# Patient Record
Sex: FEMALE | NOT HISPANIC OR LATINO | URBAN - METROPOLITAN AREA
[De-identification: names, ages, dates, MRNs, and addresses within clinical notes are randomized per-mention and may not be internally consistent; named-entity substitution may affect disease eponyms.]

---

## 2024-09-12 ENCOUNTER — LAB OUTSIDE AN ENCOUNTER (OUTPATIENT)
Dept: URBAN - METROPOLITAN AREA CLINIC 54 | Facility: CLINIC | Age: 59
End: 2024-09-12

## 2024-09-12 ENCOUNTER — OFFICE VISIT (OUTPATIENT)
Dept: URBAN - METROPOLITAN AREA CLINIC 54 | Facility: CLINIC | Age: 59
End: 2024-09-12
Payer: COMMERCIAL

## 2024-09-12 ENCOUNTER — DASHBOARD ENCOUNTERS (OUTPATIENT)
Age: 59
End: 2024-09-12

## 2024-09-12 VITALS
HEIGHT: 65 IN | WEIGHT: 161.1 LBS | HEART RATE: 94 BPM | SYSTOLIC BLOOD PRESSURE: 117 MMHG | DIASTOLIC BLOOD PRESSURE: 76 MMHG | TEMPERATURE: 97.3 F | BODY MASS INDEX: 26.84 KG/M2

## 2024-09-12 DIAGNOSIS — R10.12 LUQ PAIN: ICD-10-CM

## 2024-09-12 DIAGNOSIS — R68.81 EARLY SATIETY: ICD-10-CM

## 2024-09-12 DIAGNOSIS — R19.5 LOOSE STOOLS: ICD-10-CM

## 2024-09-12 DIAGNOSIS — Z12.11 COLON CANCER SCREENING: ICD-10-CM

## 2024-09-12 DIAGNOSIS — R14.0 ABDOMINAL BLOATING: ICD-10-CM

## 2024-09-12 PROCEDURE — 99214 OFFICE O/P EST MOD 30 MIN: CPT

## 2024-09-12 RX ORDER — TELMISARTAN 40 MG/1
1 TABLET TABLET ORAL ONCE A DAY
Status: ACTIVE | COMMUNITY

## 2024-09-12 RX ORDER — PANTOPRAZOLE SODIUM 40 MG/1
1 TABLET TABLET, DELAYED RELEASE ORAL ONCE A DAY
Status: ACTIVE | COMMUNITY

## 2024-09-12 RX ORDER — FLUVOXAMINE MALEATE 100 MG/1
1 TABLET AT BEDTIME TABLET, FILM COATED ORAL ONCE A DAY
Status: ACTIVE | COMMUNITY

## 2024-09-12 RX ORDER — HYDROCHLOROTHIAZIDE 12.5 MG/1
1 CAPSULE IN THE MORNING CAPSULE, GELATIN COATED ORAL ONCE A DAY
Status: ACTIVE | COMMUNITY

## 2024-09-12 NOTE — HPI-TODAY'S VISIT:
9/12/24: Patient is a 58 yo female with a PMH of HTN, fatty liver, GERD, and hx of CCY who was referred by COLLEEN Camacho for colon cancer screening. A copy of this document will be sent to the provider. Patient complains of postprandial LUQ pain and and bloating for the last few months, worsening over time. Reports early satiety, starting to get food avoidant. On pantoprazole 40 for years for heartburn/reflux which is controlled. Lots of bloating/distension. Leans towards diarrhea with 2-4 BMs/day which has worsened over time. Abd pain unrelated to defecation. No hematochezia, melena, or unintentional weight loss. Last cscope was normal 10 years ago. Last EGD was prior to that. No cardiopulmonary disease.

## 2024-09-12 NOTE — PHYSICAL EXAM GASTROINTESTINAL
Abdomen , mild left sided and epigastric TTP, soft, nondistended, no rebound or guarding, no masses palpable

## 2024-11-18 ENCOUNTER — OFFICE VISIT (OUTPATIENT)
Dept: URBAN - METROPOLITAN AREA SURGERY CENTER 14 | Facility: SURGERY CENTER | Age: 59
End: 2024-11-18
Payer: COMMERCIAL

## 2024-11-18 DIAGNOSIS — K31.7 GASTRIC POLYP: ICD-10-CM

## 2024-11-18 DIAGNOSIS — K22.89 DILATATION OF ESOPHAGUS: ICD-10-CM

## 2024-11-18 DIAGNOSIS — R19.7 ACUTE DIARRHEA: ICD-10-CM

## 2024-11-18 DIAGNOSIS — D12.4 ADENOMA OF DESCENDING COLON: ICD-10-CM

## 2024-11-18 DIAGNOSIS — R10.13 ABDOMINAL DISCOMFORT, EPIGASTRIC: ICD-10-CM

## 2024-11-18 DIAGNOSIS — K63.89 BACTERIAL OVERGROWTH SYNDROME: ICD-10-CM

## 2024-11-18 DIAGNOSIS — K31.7 BENIGN GASTRIC POLYP: ICD-10-CM

## 2024-11-18 DIAGNOSIS — K29.60 ADENOPAPILLOMATOSIS GASTRICA: ICD-10-CM

## 2024-11-18 DIAGNOSIS — K29.70 GASTRITIS: ICD-10-CM

## 2024-11-18 DIAGNOSIS — D12.4 BENIGN NEOPLASM OF DESCENDING COLON: ICD-10-CM

## 2024-11-18 PROCEDURE — 45380 COLONOSCOPY AND BIOPSY: CPT | Performed by: INTERNAL MEDICINE

## 2024-11-18 PROCEDURE — 43239 EGD BIOPSY SINGLE/MULTIPLE: CPT | Performed by: INTERNAL MEDICINE

## 2024-11-18 PROCEDURE — 45385 COLONOSCOPY W/LESION REMOVAL: CPT | Performed by: INTERNAL MEDICINE

## 2024-11-18 PROCEDURE — 00813 ANES UPR LWR GI NDSC PX: CPT | Performed by: NURSE ANESTHETIST, CERTIFIED REGISTERED

## 2024-11-18 PROCEDURE — 43251 EGD REMOVE LESION SNARE: CPT | Performed by: INTERNAL MEDICINE

## 2024-11-18 RX ORDER — PANTOPRAZOLE SODIUM 40 MG/1
1 TABLET TABLET, DELAYED RELEASE ORAL ONCE A DAY
Status: ACTIVE | COMMUNITY

## 2024-11-18 RX ORDER — HYDROCHLOROTHIAZIDE 12.5 MG/1
1 CAPSULE IN THE MORNING CAPSULE, GELATIN COATED ORAL ONCE A DAY
Status: ACTIVE | COMMUNITY

## 2024-11-18 RX ORDER — FLUVOXAMINE MALEATE 100 MG/1
1 TABLET AT BEDTIME TABLET, FILM COATED ORAL ONCE A DAY
Status: ACTIVE | COMMUNITY

## 2024-11-18 RX ORDER — TELMISARTAN 40 MG/1
1 TABLET TABLET ORAL ONCE A DAY
Status: ACTIVE | COMMUNITY

## 2024-12-02 ENCOUNTER — OFFICE VISIT (OUTPATIENT)
Dept: URBAN - METROPOLITAN AREA CLINIC 54 | Facility: CLINIC | Age: 59
End: 2024-12-02
Payer: COMMERCIAL

## 2024-12-02 VITALS
TEMPERATURE: 97.4 F | SYSTOLIC BLOOD PRESSURE: 118 MMHG | DIASTOLIC BLOOD PRESSURE: 69 MMHG | BODY MASS INDEX: 27.22 KG/M2 | HEIGHT: 65 IN | HEART RATE: 75 BPM | WEIGHT: 163.4 LBS

## 2024-12-02 DIAGNOSIS — R19.5 LOOSE STOOLS: ICD-10-CM

## 2024-12-02 DIAGNOSIS — R14.0 ABDOMINAL BLOATING: ICD-10-CM

## 2024-12-02 DIAGNOSIS — R10.12 LUQ PAIN: ICD-10-CM

## 2024-12-02 DIAGNOSIS — Z86.0100 HISTORY OF COLON POLYPS: ICD-10-CM

## 2024-12-02 DIAGNOSIS — R68.81 EARLY SATIETY: ICD-10-CM

## 2024-12-02 PROCEDURE — 99213 OFFICE O/P EST LOW 20 MIN: CPT

## 2024-12-02 RX ORDER — PANTOPRAZOLE SODIUM 40 MG/1
1 TABLET TABLET, DELAYED RELEASE ORAL ONCE A DAY
Status: ACTIVE | COMMUNITY

## 2024-12-02 RX ORDER — HYDROCHLOROTHIAZIDE 12.5 MG/1
1 CAPSULE IN THE MORNING CAPSULE, GELATIN COATED ORAL ONCE A DAY
Status: ACTIVE | COMMUNITY

## 2024-12-02 RX ORDER — TELMISARTAN 40 MG/1
1 TABLET TABLET ORAL ONCE A DAY
Status: ACTIVE | COMMUNITY

## 2024-12-02 RX ORDER — FLUVOXAMINE MALEATE 100 MG/1
1 TABLET AT BEDTIME TABLET, FILM COATED ORAL ONCE A DAY
Status: ACTIVE | COMMUNITY

## 2024-12-02 NOTE — HPI-TODAY'S VISIT:
9/12/24: Patient is a 58 yo female with a PMH of HTN, fatty liver, GERD, and hx of CCY who was referred by COLLEEN Camacho for colon cancer screening. A copy of this document will be sent to the provider. Patient complains of postprandial LUQ pain and and bloating for the last few months, worsening over time. Reports early satiety, starting to get food avoidant. On pantoprazole 40 for years for heartburn/reflux which is controlled. Lots of bloating/distension. Leans towards diarrhea with 2-4 BMs/day which has worsened over time. Abd pain unrelated to defecation. No hematochezia, melena, or unintentional weight loss. Last cscope was normal 10 years ago. Last EGD was prior to that. No cardiopulmonary disease.  12/2/24 Follow Up: Patient returns for follow up after EGD/cscope. One small colon TA, repeat in 7 years. Continues to have LUQ pain. Wants to work on LSMs before trying any new meds.   EGD 11/18/24: - The examined portions of the nasopharynx, oropharynx and larynx were normal. - Esophageal mucosal variant. Biopsied. - A few gastric polyps. Resected and retrieved, cold snare. - Gastric biopsies were taken with a cold forceps for Helicobacter pylori testing. - Normal examined duodenum. Biopsied. Biopsy: Mild chronic inactive gastritis, no H pylori. Fundic gland polyp. Normal esophageal and duodenal bx.   Colonoscopy 11/18/24: - Non-bleeding hemorrhoids. - Diverticulosis in the sigmoid colon. - One 3 mm polyp in the descending colon, removed with a cold snare. Resected and retrieved. - The examination was otherwise normal. - Biopsies were taken with a cold forceps from the entire colon for evaluation of microscopic colitis. - The examined portion of the ileum was normal. Biopsy: Tubular adenoma. Negative random bx for microscopic colitis. Mild chronic, inactive gastritis. No Helicobacter pylori microorganisms identified with a special stain